# Patient Record
Sex: FEMALE | Race: WHITE | NOT HISPANIC OR LATINO
[De-identification: names, ages, dates, MRNs, and addresses within clinical notes are randomized per-mention and may not be internally consistent; named-entity substitution may affect disease eponyms.]

---

## 2019-10-18 ENCOUNTER — RESULT CHARGE (OUTPATIENT)
Age: 49
End: 2019-10-18

## 2019-10-18 ENCOUNTER — APPOINTMENT (OUTPATIENT)
Dept: UROGYNECOLOGY | Facility: CLINIC | Age: 49
End: 2019-10-18
Payer: COMMERCIAL

## 2019-10-18 VITALS
HEIGHT: 67 IN | BODY MASS INDEX: 18.68 KG/M2 | OXYGEN SATURATION: 100 % | HEART RATE: 73 BPM | DIASTOLIC BLOOD PRESSURE: 75 MMHG | WEIGHT: 119 LBS | SYSTOLIC BLOOD PRESSURE: 119 MMHG | TEMPERATURE: 97.7 F

## 2019-10-18 DIAGNOSIS — Z80.3 FAMILY HISTORY OF MALIGNANT NEOPLASM OF BREAST: ICD-10-CM

## 2019-10-18 DIAGNOSIS — Z82.5 FAMILY HISTORY OF ASTHMA AND OTHER CHRONIC LOWER RESPIRATORY DISEASES: ICD-10-CM

## 2019-10-18 DIAGNOSIS — Z78.9 OTHER SPECIFIED HEALTH STATUS: ICD-10-CM

## 2019-10-18 DIAGNOSIS — Z83.3 FAMILY HISTORY OF DIABETES MELLITUS: ICD-10-CM

## 2019-10-18 LAB
BILIRUB UR QL STRIP: NEGATIVE
CLARITY UR: CLEAR
COLLECTION METHOD: NORMAL
GLUCOSE UR-MCNC: NEGATIVE
HCG UR QL: 0.2 EU/DL
HGB UR QL STRIP.AUTO: NEGATIVE
KETONES UR-MCNC: NEGATIVE
LEUKOCYTE ESTERASE UR QL STRIP: NEGATIVE
NITRITE UR QL STRIP: NEGATIVE
PH UR STRIP: 7.5
PROT UR STRIP-MCNC: NEGATIVE
SP GR UR STRIP: 1.01

## 2019-10-18 PROCEDURE — 51701 INSERT BLADDER CATHETER: CPT

## 2019-10-18 PROCEDURE — 99205 OFFICE O/P NEW HI 60 MIN: CPT | Mod: 25

## 2019-10-18 PROCEDURE — 81003 URINALYSIS AUTO W/O SCOPE: CPT | Mod: QW

## 2019-10-18 NOTE — PHYSICAL EXAM
[No Acute Distress] : in no acute distress [Oriented x3] : oriented to person, place, and time [No Edema] : ~T edema was not present [Symmetrical] : the neck was ~L symmetrical [None] : no CVA tenderness [Soft, Nontender] : the abdomen was soft and nontender [Warm and Dry] : was warm and dry to touch [Normal Gait] : gait was normal [Labia Minora] : were normal [Labia Majora] : were normal [Bartholin's Gland] : both Bartholin's glands were normal  [No Bleeding] : there was no active vaginal bleeding [Normal Appearance] : general appearance was normal [Aa ____] : Aa [unfilled] [2] : 2 [Ba ____] : Ba [unfilled] [C ____] : C [unfilled] [GH ____] : GH [unfilled] [TVL ____] : TVL  [unfilled] [PB ____] : PB [unfilled] [Bp ____] : Bp [unfilled] [Ap ____] : Ap [unfilled] [] : 0 [D ____] : D [unfilled] [Normal] : normal [Post Void Residual ____ml] : post void residual via catheterization was [unfilled] ml [Soft] :  the cervix was soft [Exam Deferred] : was deferred [Tenderness] : ~T no ~M abdominal tenderness observed [Cough] : no cough [Distended] : not distended [FreeTextEntry3] : +urethral hypermobility, CST neg [Inguinal LAD] : no adenopathy was noted in the inguinal lymph nodes [FreeTextEntry4] : no cyst, no suburethral diverticulum, 2 mm dimpling midline anterior wall - no connection directly noted to bladder [de-identified] : no appreciable masses [de-identified] : uroflow normal voiding pattern

## 2019-10-18 NOTE — ASSESSMENT
[FreeTextEntry1] : Yoselyn is a pleasant 47 yo P2, PSHx includes endometrial ablation in 2014, presents with incomplete bladder emptying, and mixed urinary incontinence. On exam, her empty supine CST was neg, however she had positive urethral hypermobility. Her straight-cath PVR volume was elevated at 200 ml and the dip was neg. On pelvic exam, there were no appreciable masses or lesions or cysts, there was no suburethral diverticulum. Pelvic floor muscle contraction strength was present but weak. There was no levator or pelvic floor musculature banding, tightness, or tenderness. POPQ exam did not demonstrate clinically significant pelvic organ prolapse.\par \par The etiology of MARYLOU was discussed. Management options including observation, behavioral modifications, medication, pessary, Impressa insert, periurethral bulking via cystoscopy, and surgery with midurethral sling were reviewed. The etiology of OAB was discussed. Management options including observation, behavioral modifications (dietary changes, monitoring fluid intake, bladder training, timed voids, use of pads/protective garments), kegels, PT, medications, PTNS, SNS, and bladder Botox were all reviewed. We also discussed elevated PVR, overflow vs incomplete emptying, etiologies such as detrusor underactivity vs obstruction vs isolated finding that should be repeated and will be followed up via PVR during UDS. We discussed how elevated PVR could limit management options for MARYLOU or OAB. She understood and agreed. Finally, we will do cysto to eval bladder in setting of insensible loss and prior instrumentation. All questions answered. Management to be triaged thereafter.\par \par Plan:\par [] UDS\par [] cysto\par [] triage management thereafter

## 2019-10-18 NOTE — OB HISTORY
[Definite ___ (Date)] : the last menstrual period was [unfilled] [Total Preg ___] : : [unfilled] [Sexually Active] : sexually active [FreeTextEntry1] : largest baby 7 1/2 lbs

## 2019-10-18 NOTE — PROCEDURE
[Straight Catheterization] : insertion of a straight catheter [Urinary Retention] : urinary retention [Stress Incontinence] : stress incontinence [Urgent Incontinence] : urgent incontinence [Urinary Frequency] : urinary frequency [Patient] : the patient [___ Fr Straight Tip] : a [unfilled] in [place] straight tip catheter [None] : there were no complications with the catheter insertion [Clear] : clear [No Complications] : no complications [Tolerated Well] : the patient tolerated the procedure well [Post procedure instructions and information given] : Post procedure instructions and information were given and reviewed with patient. [1] : 1 [FreeTextEntry1] : catheterized to obtain uncontaminated specimen and PVR, patient anxious

## 2019-11-07 ENCOUNTER — APPOINTMENT (OUTPATIENT)
Dept: UROGYNECOLOGY | Facility: CLINIC | Age: 49
End: 2019-11-07

## 2019-11-12 ENCOUNTER — APPOINTMENT (OUTPATIENT)
Dept: UROGYNECOLOGY | Facility: CLINIC | Age: 49
End: 2019-11-12

## 2019-12-19 ENCOUNTER — APPOINTMENT (OUTPATIENT)
Dept: UROGYNECOLOGY | Facility: CLINIC | Age: 49
End: 2019-12-19
Payer: COMMERCIAL

## 2019-12-19 PROCEDURE — 51784 ANAL/URINARY MUSCLE STUDY: CPT

## 2019-12-19 PROCEDURE — 51797 INTRAABDOMINAL PRESSURE TEST: CPT

## 2019-12-19 PROCEDURE — 51741 ELECTRO-UROFLOWMETRY FIRST: CPT

## 2019-12-19 PROCEDURE — 51729 CYSTOMETROGRAM W/VP&UP: CPT

## 2020-01-07 ENCOUNTER — APPOINTMENT (OUTPATIENT)
Dept: UROGYNECOLOGY | Facility: CLINIC | Age: 50
End: 2020-01-07
Payer: COMMERCIAL

## 2020-01-07 PROCEDURE — 52000 CYSTOURETHROSCOPY: CPT

## 2020-01-07 PROCEDURE — 99213 OFFICE O/P EST LOW 20 MIN: CPT | Mod: 25

## 2022-11-29 ENCOUNTER — APPOINTMENT (OUTPATIENT)
Dept: UROGYNECOLOGY | Facility: CLINIC | Age: 52
End: 2022-11-29

## 2022-11-29 DIAGNOSIS — R35.1 NOCTURIA: ICD-10-CM

## 2022-11-29 DIAGNOSIS — R33.9 RETENTION OF URINE, UNSPECIFIED: ICD-10-CM

## 2022-11-29 DIAGNOSIS — R35.0 FREQUENCY OF MICTURITION: ICD-10-CM

## 2022-11-29 DIAGNOSIS — R39.15 URGENCY OF URINATION: ICD-10-CM

## 2022-11-29 DIAGNOSIS — R32 UNSPECIFIED URINARY INCONTINENCE: ICD-10-CM

## 2022-11-29 DIAGNOSIS — N36.41 HYPERMOBILITY OF URETHRA: ICD-10-CM

## 2022-11-29 PROCEDURE — 99213 OFFICE O/P EST LOW 20 MIN: CPT

## 2022-11-29 NOTE — HISTORY OF PRESENT ILLNESS
[FreeTextEntry1] : Tabithatient last seen by myself in 2020 for MATTHIEU and subj incomplete emptying at times. In 2020, was diagnosed with colitis. She has not been in due to the pandemic. Now reports still bothered mainly by mainly MARYLOU which is worsening - pad changes are more freq and soaked, leakage with exercise, positional changes, cough sneeze and intercourse. No dysuria, UTIs, or gross hematuria.\par \par UDS PVR normal, no DO, +GSUI, borderline ISD, normal capacity.\par \par Cysto normal.

## 2022-11-29 NOTE — ASSESSMENT
[FreeTextEntry1] : MATTHIEU, MARYLOU predominant, and GSUI. Management options including observation, behavioral modifications, medication, pessary, Impressa insert, periurethral bulking via cystoscopy, and surgery with midurethral sling were reviewed. Other anti-incontinence procedures such as a [place] or fascial sling also reviewed. She will think about options and RTO prn. Details and efficacies of treatments, MUS periop care and risks reviewed. All ques answered.

## 2023-01-19 DIAGNOSIS — Z01.818 ENCOUNTER FOR OTHER PREPROCEDURAL EXAMINATION: ICD-10-CM

## 2023-01-31 ENCOUNTER — OUTPATIENT (OUTPATIENT)
Dept: OUTPATIENT SERVICES | Facility: HOSPITAL | Age: 53
LOS: 1 days | End: 2023-01-31
Payer: COMMERCIAL

## 2023-01-31 ENCOUNTER — APPOINTMENT (OUTPATIENT)
Dept: UROGYNECOLOGY | Facility: CLINIC | Age: 53
End: 2023-01-31
Payer: COMMERCIAL

## 2023-01-31 DIAGNOSIS — Z01.818 ENCOUNTER FOR OTHER PREPROCEDURAL EXAMINATION: ICD-10-CM

## 2023-01-31 LAB
ANION GAP SERPL CALC-SCNC: 11 MMOL/L — SIGNIFICANT CHANGE UP (ref 5–17)
APPEARANCE UR: CLEAR — SIGNIFICANT CHANGE UP
BASOPHILS # BLD AUTO: 0.02 K/UL — SIGNIFICANT CHANGE UP (ref 0–0.2)
BASOPHILS NFR BLD AUTO: 0.4 % — SIGNIFICANT CHANGE UP (ref 0–2)
BILIRUB UR-MCNC: NEGATIVE — SIGNIFICANT CHANGE UP
BUN SERPL-MCNC: 10.9 MG/DL — SIGNIFICANT CHANGE UP (ref 8–20)
CALCIUM SERPL-MCNC: 9 MG/DL — SIGNIFICANT CHANGE UP (ref 8.4–10.5)
CHLORIDE SERPL-SCNC: 102 MMOL/L — SIGNIFICANT CHANGE UP (ref 96–108)
CO2 SERPL-SCNC: 27 MMOL/L — SIGNIFICANT CHANGE UP (ref 22–29)
COLOR SPEC: YELLOW — SIGNIFICANT CHANGE UP
CREAT SERPL-MCNC: 0.7 MG/DL — SIGNIFICANT CHANGE UP (ref 0.5–1.3)
DIFF PNL FLD: NEGATIVE — SIGNIFICANT CHANGE UP
EGFR: 104 ML/MIN/1.73M2 — SIGNIFICANT CHANGE UP
EOSINOPHIL # BLD AUTO: 0.1 K/UL — SIGNIFICANT CHANGE UP (ref 0–0.5)
EOSINOPHIL NFR BLD AUTO: 2 % — SIGNIFICANT CHANGE UP (ref 0–6)
GLUCOSE SERPL-MCNC: 83 MG/DL — SIGNIFICANT CHANGE UP (ref 70–99)
GLUCOSE UR QL: NEGATIVE MG/DL — SIGNIFICANT CHANGE UP
HCT VFR BLD CALC: 41.9 % — SIGNIFICANT CHANGE UP (ref 34.5–45)
HGB BLD-MCNC: 14.2 G/DL — SIGNIFICANT CHANGE UP (ref 11.5–15.5)
IMM GRANULOCYTES NFR BLD AUTO: 0.2 % — SIGNIFICANT CHANGE UP (ref 0–0.9)
KETONES UR-MCNC: NEGATIVE — SIGNIFICANT CHANGE UP
LEUKOCYTE ESTERASE UR-ACNC: NEGATIVE — SIGNIFICANT CHANGE UP
LYMPHOCYTES # BLD AUTO: 1.88 K/UL — SIGNIFICANT CHANGE UP (ref 1–3.3)
LYMPHOCYTES # BLD AUTO: 38.1 % — SIGNIFICANT CHANGE UP (ref 13–44)
MCHC RBC-ENTMCNC: 29.3 PG — SIGNIFICANT CHANGE UP (ref 27–34)
MCHC RBC-ENTMCNC: 33.9 GM/DL — SIGNIFICANT CHANGE UP (ref 32–36)
MCV RBC AUTO: 86.6 FL — SIGNIFICANT CHANGE UP (ref 80–100)
MONOCYTES # BLD AUTO: 0.46 K/UL — SIGNIFICANT CHANGE UP (ref 0–0.9)
MONOCYTES NFR BLD AUTO: 9.3 % — SIGNIFICANT CHANGE UP (ref 2–14)
NEUTROPHILS # BLD AUTO: 2.46 K/UL — SIGNIFICANT CHANGE UP (ref 1.8–7.4)
NEUTROPHILS NFR BLD AUTO: 50 % — SIGNIFICANT CHANGE UP (ref 43–77)
NITRITE UR-MCNC: NEGATIVE — SIGNIFICANT CHANGE UP
PH UR: 6 — SIGNIFICANT CHANGE UP (ref 5–8)
PLATELET # BLD AUTO: 209 K/UL — SIGNIFICANT CHANGE UP (ref 150–400)
POTASSIUM SERPL-MCNC: 3.9 MMOL/L — SIGNIFICANT CHANGE UP (ref 3.5–5.3)
POTASSIUM SERPL-SCNC: 3.9 MMOL/L — SIGNIFICANT CHANGE UP (ref 3.5–5.3)
PROT UR-MCNC: NEGATIVE — SIGNIFICANT CHANGE UP
RBC # BLD: 4.84 M/UL — SIGNIFICANT CHANGE UP (ref 3.8–5.2)
RBC # FLD: 12.6 % — SIGNIFICANT CHANGE UP (ref 10.3–14.5)
SODIUM SERPL-SCNC: 140 MMOL/L — SIGNIFICANT CHANGE UP (ref 135–145)
SP GR SPEC: 1.01 — SIGNIFICANT CHANGE UP (ref 1.01–1.02)
UROBILINOGEN FLD QL: NEGATIVE MG/DL — SIGNIFICANT CHANGE UP
WBC # BLD: 4.93 K/UL — SIGNIFICANT CHANGE UP (ref 3.8–10.5)
WBC # FLD AUTO: 4.93 K/UL — SIGNIFICANT CHANGE UP (ref 3.8–10.5)

## 2023-01-31 PROCEDURE — 36415 COLL VENOUS BLD VENIPUNCTURE: CPT

## 2023-01-31 PROCEDURE — 99214 OFFICE O/P EST MOD 30 MIN: CPT

## 2023-01-31 PROCEDURE — 81003 URINALYSIS AUTO W/O SCOPE: CPT

## 2023-01-31 PROCEDURE — G0463: CPT

## 2023-01-31 PROCEDURE — 85025 COMPLETE CBC W/AUTO DIFF WBC: CPT

## 2023-01-31 PROCEDURE — 93010 ELECTROCARDIOGRAM REPORT: CPT

## 2023-01-31 PROCEDURE — 93005 ELECTROCARDIOGRAM TRACING: CPT

## 2023-01-31 PROCEDURE — 87086 URINE CULTURE/COLONY COUNT: CPT

## 2023-01-31 PROCEDURE — 80048 BASIC METABOLIC PNL TOTAL CA: CPT

## 2023-01-31 NOTE — ASSESSMENT
[FreeTextEntry1] : MATTHIEU, GSUI. We discussed the placement of a mid-urethral sling. Risks and benefits of a TOT sling versus a TVT vs mini sling routes were discussed including bladder and bowel perforation, vascular injury and hemorrhage, voiding dysfunction, UTIs, dyspareunia, and groin pain. The risks and benefits of the procedure were discussed and included but were not limited to risks of anesthesia, cardiopulmonary arrest, stroke, MI, bleeding, hemorrhage, transfusion, hematoma formation, blood clot formation, infection, failure, mesh erosion or exposure, fistula formation, chronic pain, dyspareunia, damage to surrounding organs such as bladder / bowel / ureters / rectum / nerves / vessels, UTIs, voiding dysfunction, urinary retention, need for further surgery, and new onset / persistent / or worsening OAB symptoms. The FDA notifications regarding the use of vaginal mesh were reviewed and the website was provided to refer to. We discussed the possibility of going home with a catheter. Hospital stay, anesthesia, and postoperative restrictions were discussed. She understood all counseling. All questions were answered and she wishes to proceed with surgical correction. Consent was signed and witnessed in the office.\par \par Plan:\par [] book EUA / MUS / cysto / other indicated procedures\par

## 2023-01-31 NOTE — HISTORY OF PRESENT ILLNESS
[FreeTextEntry1] : MATTHIEU, MARYLOU predominant and GSUI. Desires surg correction of GSUI and declines nonsurg intervention such as observation, behavioral modifications, medication, pessary, Impressa insert, periurethral bulking via cystoscopy. We reviewed in detail OAB vs MARYLOU and insensible loss. We discussed unclear if insensible loss is MARYLOU vs OAB, but reviewed that MUS intended to treat MARYLOU and not OAB, which she understood - may require OAB treatment afterwards.\par \par UDS PVR normal, no DO, +GSUI, borderline ISD, normal capacity.\par \par Cysto normal. \par \par PSH: em ablat, ov cystectomy\par Allgs: sulfa, PCN "as a child, haven't used it since so not sure"

## 2023-02-01 LAB
CULTURE RESULTS: SIGNIFICANT CHANGE UP
SPECIMEN SOURCE: SIGNIFICANT CHANGE UP

## 2023-02-10 LAB — SARS-COV-2 N GENE NPH QL NAA+PROBE: DETECTED

## 2023-02-14 ENCOUNTER — APPOINTMENT (OUTPATIENT)
Dept: UROGYNECOLOGY | Facility: AMBULATORY SURGERY CENTER | Age: 53
End: 2023-02-14

## 2023-03-03 ENCOUNTER — APPOINTMENT (OUTPATIENT)
Dept: UROGYNECOLOGY | Facility: CLINIC | Age: 53
End: 2023-03-03

## 2023-03-16 ENCOUNTER — OUTPATIENT (OUTPATIENT)
Dept: OUTPATIENT SERVICES | Facility: HOSPITAL | Age: 53
LOS: 1 days | End: 2023-03-16
Payer: COMMERCIAL

## 2023-03-16 VITALS
DIASTOLIC BLOOD PRESSURE: 89 MMHG | SYSTOLIC BLOOD PRESSURE: 124 MMHG | RESPIRATION RATE: 16 BRPM | WEIGHT: 141.98 LBS | TEMPERATURE: 97 F | HEART RATE: 72 BPM | HEIGHT: 67 IN | OXYGEN SATURATION: 99 %

## 2023-03-16 DIAGNOSIS — N39.3 STRESS INCONTINENCE (FEMALE) (MALE): ICD-10-CM

## 2023-03-16 DIAGNOSIS — Z98.890 OTHER SPECIFIED POSTPROCEDURAL STATES: Chronic | ICD-10-CM

## 2023-03-16 DIAGNOSIS — Z90.89 ACQUIRED ABSENCE OF OTHER ORGANS: Chronic | ICD-10-CM

## 2023-03-16 DIAGNOSIS — Z01.818 ENCOUNTER FOR OTHER PREPROCEDURAL EXAMINATION: ICD-10-CM

## 2023-03-16 LAB
ANION GAP SERPL CALC-SCNC: 3 MMOL/L — LOW (ref 5–17)
APPEARANCE UR: CLEAR — SIGNIFICANT CHANGE UP
BILIRUB UR-MCNC: NEGATIVE — SIGNIFICANT CHANGE UP
BUN SERPL-MCNC: 13 MG/DL — SIGNIFICANT CHANGE UP (ref 7–23)
CALCIUM SERPL-MCNC: 9.3 MG/DL — SIGNIFICANT CHANGE UP (ref 8.5–10.1)
CHLORIDE SERPL-SCNC: 102 MMOL/L — SIGNIFICANT CHANGE UP (ref 96–108)
CO2 SERPL-SCNC: 32 MMOL/L — HIGH (ref 22–31)
COLOR SPEC: YELLOW — SIGNIFICANT CHANGE UP
CREAT SERPL-MCNC: 0.7 MG/DL — SIGNIFICANT CHANGE UP (ref 0.5–1.3)
DIFF PNL FLD: NEGATIVE — SIGNIFICANT CHANGE UP
EGFR: 104 ML/MIN/1.73M2 — SIGNIFICANT CHANGE UP
GLUCOSE SERPL-MCNC: 96 MG/DL — SIGNIFICANT CHANGE UP (ref 70–99)
GLUCOSE UR QL: NEGATIVE — SIGNIFICANT CHANGE UP
HCG UR QL: NEGATIVE — SIGNIFICANT CHANGE UP
HCT VFR BLD CALC: 42.3 % — SIGNIFICANT CHANGE UP (ref 34.5–45)
HGB BLD-MCNC: 14 G/DL — SIGNIFICANT CHANGE UP (ref 11.5–15.5)
KETONES UR-MCNC: NEGATIVE — SIGNIFICANT CHANGE UP
LEUKOCYTE ESTERASE UR-ACNC: ABNORMAL
MCHC RBC-ENTMCNC: 29.2 PG — SIGNIFICANT CHANGE UP (ref 27–34)
MCHC RBC-ENTMCNC: 33.1 GM/DL — SIGNIFICANT CHANGE UP (ref 32–36)
MCV RBC AUTO: 88.1 FL — SIGNIFICANT CHANGE UP (ref 80–100)
NITRITE UR-MCNC: NEGATIVE — SIGNIFICANT CHANGE UP
NRBC # BLD: 0 /100 WBCS — SIGNIFICANT CHANGE UP (ref 0–0)
PH UR: 6.5 — SIGNIFICANT CHANGE UP (ref 5–8)
PLATELET # BLD AUTO: 220 K/UL — SIGNIFICANT CHANGE UP (ref 150–400)
POTASSIUM SERPL-MCNC: 3.8 MMOL/L — SIGNIFICANT CHANGE UP (ref 3.5–5.3)
POTASSIUM SERPL-SCNC: 3.8 MMOL/L — SIGNIFICANT CHANGE UP (ref 3.5–5.3)
PROT UR-MCNC: NEGATIVE — SIGNIFICANT CHANGE UP
RBC # BLD: 4.8 M/UL — SIGNIFICANT CHANGE UP (ref 3.8–5.2)
RBC # FLD: 12.8 % — SIGNIFICANT CHANGE UP (ref 10.3–14.5)
SODIUM SERPL-SCNC: 137 MMOL/L — SIGNIFICANT CHANGE UP (ref 135–145)
SP GR SPEC: 1.01 — SIGNIFICANT CHANGE UP (ref 1.01–1.02)
UROBILINOGEN FLD QL: NEGATIVE — SIGNIFICANT CHANGE UP
WBC # BLD: 5.22 K/UL — SIGNIFICANT CHANGE UP (ref 3.8–10.5)
WBC # FLD AUTO: 5.22 K/UL — SIGNIFICANT CHANGE UP (ref 3.8–10.5)

## 2023-03-16 PROCEDURE — 87086 URINE CULTURE/COLONY COUNT: CPT

## 2023-03-16 PROCEDURE — G0463: CPT

## 2023-03-16 PROCEDURE — 81001 URINALYSIS AUTO W/SCOPE: CPT

## 2023-03-16 PROCEDURE — 36415 COLL VENOUS BLD VENIPUNCTURE: CPT

## 2023-03-16 PROCEDURE — 85027 COMPLETE CBC AUTOMATED: CPT

## 2023-03-16 PROCEDURE — 80048 BASIC METABOLIC PNL TOTAL CA: CPT

## 2023-03-16 PROCEDURE — 81025 URINE PREGNANCY TEST: CPT

## 2023-03-16 NOTE — H&P PST ADULT - HISTORY OF PRESENT ILLNESS
51 yo female with Colitis dx March 2020, presently in remission, presents to [facility] scheduled for a midurethral sling  - cystoscopy on 3/28/2023 with Dr. Lee. Previously preop in Jan for procedure scheduled for Feb but tested positive for Covid. Denies s/s. Reports stress incontinence. Leaks urine when she coughs and sneezes. Denies UTIs, dysuria and hematuria.

## 2023-03-16 NOTE — H&P PST ADULT - ATTENDING COMMENTS
Patient seen, continues to desire surg correction of GSUI, declines nonsurg intervention. Risks of surgery including but not limited to risks of anesthesia, MI, stroke, cardiopulmonary arrest, bleeding, hematoma formation, hemorrhage, transfusion, infections, abscess formation, fistula formation, mesh-related complication such as exposure or erosion, damage to surrounding structures such as bowel / rectum / bladder / ureters / nerves / vessels, recurrence, failure, need for further surg all discussed and she would like to proceed. Consented for EUA / MUS / cysto / other indicated procedures. All ques answered.

## 2023-03-16 NOTE — H&P PST ADULT - PROBLEM SELECTOR PLAN 2
Labs - CBC, BMP, UCG, UA and C/S. EKG on chart  MC with PCP  Pre op instructions reviewed and given. No meds to take am of surgery. Instructed to hold and/or avoid other NSAIDs and OTC supplements. Tylenol is ok. Verbalized understanding

## 2023-03-16 NOTE — H&P PST ADULT - NSICDXPASTMEDICALHX_GEN_ALL_CORE_FT
PAST MEDICAL HISTORY:  2019 novel coronavirus disease (COVID-19)     Colitis     Female stress incontinence

## 2023-03-16 NOTE — H&P PST ADULT - NSICDXPASTSURGICALHX_GEN_ALL_CORE_FT
PAST SURGICAL HISTORY:  H/O colonoscopy     H/O endoscopy     H/O ovarian cystectomy     History of endometrial ablation     History of tonsillectomy and adenoidectomy

## 2023-03-18 LAB
CULTURE RESULTS: SIGNIFICANT CHANGE UP
SPECIMEN SOURCE: SIGNIFICANT CHANGE UP

## 2023-03-24 ENCOUNTER — APPOINTMENT (OUTPATIENT)
Dept: UROGYNECOLOGY | Facility: CLINIC | Age: 53
End: 2023-03-24

## 2023-03-27 ENCOUNTER — TRANSCRIPTION ENCOUNTER (OUTPATIENT)
Age: 53
End: 2023-03-27

## 2023-03-28 ENCOUNTER — TRANSCRIPTION ENCOUNTER (OUTPATIENT)
Age: 53
End: 2023-03-28

## 2023-03-28 ENCOUNTER — APPOINTMENT (OUTPATIENT)
Dept: UROGYNECOLOGY | Facility: HOSPITAL | Age: 53
End: 2023-03-28

## 2023-03-28 ENCOUNTER — OUTPATIENT (OUTPATIENT)
Dept: OUTPATIENT SERVICES | Facility: HOSPITAL | Age: 53
LOS: 1 days | End: 2023-03-28
Payer: COMMERCIAL

## 2023-03-28 VITALS
HEART RATE: 74 BPM | DIASTOLIC BLOOD PRESSURE: 72 MMHG | RESPIRATION RATE: 15 BRPM | SYSTOLIC BLOOD PRESSURE: 126 MMHG | OXYGEN SATURATION: 100 % | TEMPERATURE: 98 F

## 2023-03-28 VITALS
HEART RATE: 73 BPM | WEIGHT: 141.98 LBS | TEMPERATURE: 98 F | OXYGEN SATURATION: 98 % | DIASTOLIC BLOOD PRESSURE: 79 MMHG | HEIGHT: 67 IN | SYSTOLIC BLOOD PRESSURE: 140 MMHG | RESPIRATION RATE: 15 BRPM

## 2023-03-28 DIAGNOSIS — Z98.890 OTHER SPECIFIED POSTPROCEDURAL STATES: Chronic | ICD-10-CM

## 2023-03-28 DIAGNOSIS — Z90.89 ACQUIRED ABSENCE OF OTHER ORGANS: Chronic | ICD-10-CM

## 2023-03-28 DIAGNOSIS — N39.3 STRESS INCONTINENCE (FEMALE) (MALE): ICD-10-CM

## 2023-03-28 LAB — HCG UR QL: NEGATIVE — SIGNIFICANT CHANGE UP

## 2023-03-28 PROCEDURE — 57288 REPAIR BLADDER DEFECT: CPT | Mod: AS

## 2023-03-28 PROCEDURE — 57288 REPAIR BLADDER DEFECT: CPT

## 2023-03-28 PROCEDURE — C1771: CPT

## 2023-03-28 PROCEDURE — 81025 URINE PREGNANCY TEST: CPT

## 2023-03-28 DEVICE — SLING TRANSVAGINAL MID-URETHRAL ADVANTAGE FIT BLUE: Type: IMPLANTABLE DEVICE | Status: FUNCTIONAL

## 2023-03-28 RX ORDER — CHOLECALCIFEROL (VITAMIN D3) 125 MCG
1 CAPSULE ORAL
Qty: 0 | Refills: 0 | DISCHARGE

## 2023-03-28 RX ORDER — SODIUM CHLORIDE 9 MG/ML
1000 INJECTION, SOLUTION INTRAVENOUS
Refills: 0 | Status: DISCONTINUED | OUTPATIENT
Start: 2023-03-28 | End: 2023-03-28

## 2023-03-28 RX ORDER — HYDROMORPHONE HYDROCHLORIDE 2 MG/ML
0.5 INJECTION INTRAMUSCULAR; INTRAVENOUS; SUBCUTANEOUS
Refills: 0 | Status: DISCONTINUED | OUTPATIENT
Start: 2023-03-28 | End: 2023-03-28

## 2023-03-28 RX ORDER — MESALAMINE 400 MG
2 TABLET, DELAYED RELEASE (ENTERIC COATED) ORAL
Qty: 0 | Refills: 0 | DISCHARGE

## 2023-03-28 RX ORDER — OXYCODONE HYDROCHLORIDE 5 MG/1
5 TABLET ORAL ONCE
Refills: 0 | Status: DISCONTINUED | OUTPATIENT
Start: 2023-03-28 | End: 2023-03-28

## 2023-03-28 RX ORDER — BLACK COHOSH ROOT 20 MG
0 TABLET ORAL
Qty: 0 | Refills: 0 | DISCHARGE

## 2023-03-28 RX ORDER — VITAMIN E 100 UNIT
0 CAPSULE ORAL
Qty: 0 | Refills: 0 | DISCHARGE

## 2023-03-28 RX ORDER — MILK THISTLE 150 MG
0 CAPSULE ORAL
Qty: 0 | Refills: 0 | DISCHARGE

## 2023-03-28 RX ORDER — METRONIDAZOLE 500 MG
500 TABLET ORAL ONCE
Refills: 0 | Status: DISCONTINUED | OUTPATIENT
Start: 2023-03-28 | End: 2023-03-28

## 2023-03-28 RX ORDER — GENTAMICIN SULFATE 40 MG/ML
300 VIAL (ML) INJECTION ONCE
Refills: 0 | Status: DISCONTINUED | OUTPATIENT
Start: 2023-03-28 | End: 2023-03-28

## 2023-03-28 RX ORDER — ONDANSETRON 8 MG/1
4 TABLET, FILM COATED ORAL ONCE
Refills: 0 | Status: DISCONTINUED | OUTPATIENT
Start: 2023-03-28 | End: 2023-03-28

## 2023-03-28 RX ADMIN — SODIUM CHLORIDE 60 MILLILITER(S): 9 INJECTION, SOLUTION INTRAVENOUS at 09:08

## 2023-03-28 RX ADMIN — SODIUM CHLORIDE 75 MILLILITER(S): 9 INJECTION, SOLUTION INTRAVENOUS at 12:54

## 2023-03-28 NOTE — BRIEF OPERATIVE NOTE - NSICDXBRIEFPREOP_GEN_ALL_CORE_FT
PRE-OP DIAGNOSIS:  Female stress incontinence 28-Mar-2023 10:58:20  Margret Gonzalez  
PRE-OP DIAGNOSIS:  Female stress incontinence 28-Mar-2023 10:58:20  Margret Gonzalez

## 2023-03-28 NOTE — ASU DISCHARGE PLAN (ADULT/PEDIATRIC) - CARE PROVIDER_API CALL
Abida Fraire)  Female Pelvic MedReconst Surg; Obstetrics and Gynecology  [address], [address]  [place] [address]  Phone: [phone]  Fax: [phone]  Follow Up Time:

## 2023-03-28 NOTE — BRIEF OPERATIVE NOTE - NSICDXBRIEFPOSTOP_GEN_ALL_CORE_FT
POST-OP DIAGNOSIS:  Female stress incontinence 28-Mar-2023 10:58:41  Margret Gonzalez  
POST-OP DIAGNOSIS:  Female stress incontinence 28-Mar-2023 10:58:41  Margret Gonzalez

## 2023-03-28 NOTE — ASU DISCHARGE PLAN (ADULT/PEDIATRIC) - NS MD DC FALL RISK RISK
For information on Fall & Injury Prevention, visit: [email or web address] OR  [email or web address] OR  https://www.cdc.gov/steadi/patient.html

## 2023-03-28 NOTE — ASU DISCHARGE PLAN (ADULT/PEDIATRIC) - ASU DC SPECIAL INSTRUCTIONSFT
Follow up with Dr. Fraire in 2 weeks. Call the office at the number below to schedule your appointment. You may shower; soap and water over incision sites. Do not scrub. Pat dry when done. No tub bathing or swimming until cleared. Ambulate as tolerated, but no heavy lifting (>10lbs) or strenuous exercise. You may resume regular diet. You should be urinating at least 3-4x per day. Call the office if you experience increasing abdominal pain, nausea, vomiting, or temperature >101 F.    You may take Tylenol OTC 1000mg every 6 hours as needed for mild pain, no more than 4000mg per day. You can also take Ibuprofen OTC as needed for moderate pain 400mg every 6 hours, no more than 2400mg per day.

## 2023-03-28 NOTE — BRIEF OPERATIVE NOTE - NSICDXBRIEFPROCEDURE_GEN_ALL_CORE_FT
PROCEDURES:  Creation of midurethral sling with cystoscopy 28-Mar-2023 10:58:09  Margret Gonzalez  
PROCEDURES:  Creation of midurethral sling with cystoscopy 28-Mar-2023 10:58:09  Margret Gonzalez

## 2023-03-28 NOTE — BRIEF OPERATIVE NOTE - OPERATION/FINDINGS
Creation of midurethral sling with cystoscopy, uncomplicated, urine jet visualized at ureterovesical junction bilaterally.
Normal cystourethroscopy, good hemostasis

## 2023-04-19 ENCOUNTER — APPOINTMENT (OUTPATIENT)
Dept: UROGYNECOLOGY | Facility: CLINIC | Age: 53
End: 2023-04-19
Payer: COMMERCIAL

## 2023-04-19 VITALS
DIASTOLIC BLOOD PRESSURE: 74 MMHG | HEIGHT: 67 IN | TEMPERATURE: 98.3 F | OXYGEN SATURATION: 99 % | SYSTOLIC BLOOD PRESSURE: 116 MMHG | BODY MASS INDEX: 21.97 KG/M2 | HEART RATE: 65 BPM | WEIGHT: 140 LBS

## 2023-04-19 DIAGNOSIS — Z98.890 OTHER SPECIFIED POSTPROCEDURAL STATES: ICD-10-CM

## 2023-04-19 PROCEDURE — 99024 POSTOP FOLLOW-UP VISIT: CPT

## 2023-04-19 PROCEDURE — 51798 US URINE CAPACITY MEASURE: CPT

## 2023-04-19 NOTE — OBJECTIVE
[Post Void Residual ____ ml] : Post Void Residual was [unfilled] ml [Soft and Nontender] : soft and nontender [Clean, Dry, Intact] : Clean, Dry, Intact [Good Support] : Good support [Healing well] : healing well [No Masses or Tenderness] : no masses or tenderness [FreeTextEntry3] : + sutures, no evidence of mesh erosion noted

## 2023-04-19 NOTE — SUBJECTIVE
[FreeTextEntry1] : overall doing well [FreeTextEntry8] : none new [FreeTextEntry7] : none [FreeTextEntry6] : normal [FreeTextEntry5] : denies any dysuria or incomplete emptying.  ?MARYLOU vs discharge when walking but notes about 80% improvement in MARYLOU [FreeTextEntry4] : normal, taking colace [FreeTextEntry3] : normal [FreeTextEntry2] : normal

## 2023-04-19 NOTE — DISCUSSION/SUMMARY
[Post-Op instructions given. Pt/family verbalizes understanding] : post-operative instructions were provided to the patient/family who verbalize understanding [FreeTextEntry1] : Activity restrictions discussed.  F/u 4 weeks or sooner if needed.  Instructed to call with any questions or concerns and she verbalizes understanding.

## 2023-05-24 ENCOUNTER — APPOINTMENT (OUTPATIENT)
Dept: UROGYNECOLOGY | Facility: CLINIC | Age: 53
End: 2023-05-24
Payer: COMMERCIAL

## 2023-05-24 ENCOUNTER — RESULT CHARGE (OUTPATIENT)
Age: 53
End: 2023-05-24

## 2023-05-24 VITALS
HEART RATE: 65 BPM | WEIGHT: 140 LBS | OXYGEN SATURATION: 99 % | HEIGHT: 67 IN | SYSTOLIC BLOOD PRESSURE: 121 MMHG | BODY MASS INDEX: 21.97 KG/M2 | TEMPERATURE: 97.7 F | DIASTOLIC BLOOD PRESSURE: 76 MMHG

## 2023-05-24 LAB
BILIRUB UR QL STRIP: NEGATIVE
CLARITY UR: CLEAR
COLLECTION METHOD: NORMAL
GLUCOSE UR-MCNC: NEGATIVE
HCG UR QL: 0.2 EU/DL
HGB UR QL STRIP.AUTO: NEGATIVE
KETONES UR-MCNC: NEGATIVE
LEUKOCYTE ESTERASE UR QL STRIP: NEGATIVE
NITRITE UR QL STRIP: NEGATIVE
PH UR STRIP: 7
PROT UR STRIP-MCNC: NEGATIVE
SP GR UR STRIP: 1.01

## 2023-05-24 PROCEDURE — 99024 POSTOP FOLLOW-UP VISIT: CPT

## 2023-05-24 PROCEDURE — 51798 US URINE CAPACITY MEASURE: CPT

## 2023-05-24 NOTE — SUBJECTIVE
[FreeTextEntry1] : doing well, feels 99% improved regarding urinary symptoms, no fever/chills, no vag discharge [FreeTextEntry7] : none, slight rare mons twinge [FreeTextEntry6] : normal, no N/V [FreeTextEntry5] : good, no incomplete emptying, no dysuria, no urgency/freq, rare small MARYLOU leak about 99% improved, no urgency leakage [FreeTextEntry4] : soft daily with stool softener

## 2023-05-24 NOTE — OBJECTIVE
[Post Void Residual ____ ml] : Post Void Residual was [unfilled] ml [Soft and Nontender] : soft and nontender [Clean, Dry, Intact] : Clean, Dry, Intact [Good Support] : Good support [Healing well] : healing well [No Masses or Tenderness] : no masses or tenderness [FreeTextEntry3] : suture line intact, no mesh exposure, normal

## 2023-05-24 NOTE — DISCUSSION/SUMMARY
[FreeTextEntry1] : 6 weeks postop s/p EUA / MUS / cystoscopy. Doing well. Exam normal. Can wean off stool softeners - has hx colitis and is on her own routine meds. Resume usual activities. RTO prn.

## 2023-11-19 ENCOUNTER — NON-APPOINTMENT (OUTPATIENT)
Age: 53
End: 2023-11-19

## 2023-12-31 ENCOUNTER — NON-APPOINTMENT (OUTPATIENT)
Age: 53
End: 2023-12-31

## 2024-01-07 ENCOUNTER — NON-APPOINTMENT (OUTPATIENT)
Age: 54
End: 2024-01-07

## 2024-09-17 ENCOUNTER — NON-APPOINTMENT (OUTPATIENT)
Age: 54
End: 2024-09-17

## 2025-07-01 ENCOUNTER — APPOINTMENT (OUTPATIENT)
Dept: UROGYNECOLOGY | Facility: CLINIC | Age: 55
End: 2025-07-01
Payer: COMMERCIAL

## 2025-07-01 VITALS
HEIGHT: 67 IN | SYSTOLIC BLOOD PRESSURE: 133 MMHG | DIASTOLIC BLOOD PRESSURE: 80 MMHG | BODY MASS INDEX: 21.97 KG/M2 | OXYGEN SATURATION: 98 % | HEART RATE: 72 BPM | WEIGHT: 140 LBS

## 2025-07-01 PROCEDURE — 99214 OFFICE O/P EST MOD 30 MIN: CPT

## 2025-07-01 RX ORDER — DIAZEPAM 100 %
POWDER (GRAM) MISCELLANEOUS
Qty: 30 | Refills: 0 | Status: ACTIVE | COMMUNITY
Start: 2025-07-01 | End: 1900-01-01

## 2025-07-01 RX ORDER — MIRABEGRON 25 MG/1
25 TABLET, EXTENDED RELEASE ORAL
Qty: 90 | Refills: 2 | Status: ACTIVE | COMMUNITY
Start: 2025-07-01 | End: 1900-01-01

## (undated) DEVICE — SUT VICRYL 2-0 36" CT-1

## (undated) DEVICE — DRAPE LAVH 124" X 30" X125"

## (undated) DEVICE — NDL HYPO SAFE 22G X 1.5" (BLACK)

## (undated) DEVICE — VENODYNE/SCD SLEEVE CALF MEDIUM

## (undated) DEVICE — GLV 6.5 PROTEXIS (WHITE)

## (undated) DEVICE — FOLEY TRAY 16FR 5CC LTX URINE METER TEMP SENSING CLOSED

## (undated) DEVICE — PLV-SCD MACHINE: Type: DURABLE MEDICAL EQUIPMENT

## (undated) DEVICE — WARMING BLANKET FULL ADULT

## (undated) DEVICE — BAG DECANTER IV STERILE

## (undated) DEVICE — TUBING CONNECTING 6MM 20FT

## (undated) DEVICE — DRSG PAD SANITARY OB

## (undated) DEVICE — ELCTR GROUNDING PAD ADULT COVIDIEN

## (undated) DEVICE — PREP TRAY DRY SKIN PREP SCRUB

## (undated) DEVICE — TUBING TUR 2 PRONG

## (undated) DEVICE — DRSG DERMABOND 0.7ML

## (undated) DEVICE — TUBING IRR SET FOR CYSTOSCOPY 77"

## (undated) DEVICE — DRAPE INSTRUMENT POUCH 6.75" X 11"

## (undated) DEVICE — DRAPE TOWEL BLUE 17" X 24"

## (undated) DEVICE — PACK MINOR WITH LAP

## (undated) DEVICE — PLV/PSP-ESU FORCEFX F8J7721A: Type: DURABLE MEDICAL EQUIPMENT

## (undated) DEVICE — TRAY IRRIGATION SYR BULB 60CC

## (undated) DEVICE — ELCTR BOVIE TIP BLADE INSULATED 2.75" EDGE

## (undated) DEVICE — SUCTION YANKAUER TAPERED BULBOUS NO VENT